# Patient Record
Sex: MALE | URBAN - METROPOLITAN AREA
[De-identification: names, ages, dates, MRNs, and addresses within clinical notes are randomized per-mention and may not be internally consistent; named-entity substitution may affect disease eponyms.]

---

## 2022-05-18 ENCOUNTER — APPOINTMENT (OUTPATIENT)
Dept: URBAN - METROPOLITAN AREA CLINIC 319 | Age: 37
Setting detail: DERMATOLOGY
End: 2022-05-18

## 2022-05-18 PROBLEM — C43.72 MALIGNANT MELANOMA OF LEFT LOWER LIMB, INCLUDING HIP: Status: ACTIVE | Noted: 2022-05-18

## 2022-05-18 PROCEDURE — OTHER EXCISION: OTHER

## 2022-05-18 PROCEDURE — OTHER PRESCRIPTION: OTHER

## 2022-05-18 PROCEDURE — 11603 EXC TR-EXT MAL+MARG 2.1-3 CM: CPT

## 2022-05-18 RX ORDER — CHLORHEXIDINE GLUCONATE 213 G/1000ML
SOLUTION TOPICAL
Qty: 118 | Refills: 1 | Status: ERX | COMMUNITY
Start: 2022-05-18

## 2022-05-18 RX ORDER — MUPIROCIN 20 MG/G
OINTMENT TOPICAL
Qty: 22 | Refills: 1 | Status: ERX | COMMUNITY
Start: 2022-05-18

## 2022-05-18 NOTE — PROCEDURE: EXCISION
Post-Care Instructions: I reviewed with the patient in detail post-care instructions. Patient is not to engage in any heavy lifting, exercise, or swimming for the next 14 days. Should the patient develop any fevers, chills, bleeding, severe pain patient will contact the office immediately. Unna boot to be left in place for one week.

## 2022-06-02 ENCOUNTER — APPOINTMENT (OUTPATIENT)
Dept: URBAN - METROPOLITAN AREA CLINIC 319 | Age: 37
Setting detail: DERMATOLOGY
End: 2022-06-02

## 2022-06-02 DIAGNOSIS — Z48.817 ENCOUNTER FOR SURGICAL AFTERCARE FOLLOWING SURGERY ON THE SKIN AND SUBCUTANEOUS TISSUE: ICD-10-CM

## 2022-06-02 PROCEDURE — OTHER PHOTO-DOCUMENTATION: OTHER

## 2022-06-02 PROCEDURE — 99212 OFFICE O/P EST SF 10 MIN: CPT

## 2022-06-02 PROCEDURE — OTHER POST-OP WOUND CHECK (NO GLOBAL PERIOD): OTHER

## 2022-06-02 ASSESSMENT — LOCATION DETAILED DESCRIPTION DERM: LOCATION DETAILED: LEFT MEDIAL DISTAL PRETIBIAL REGION

## 2022-06-02 ASSESSMENT — LOCATION ZONE DERM: LOCATION ZONE: LEG

## 2022-06-02 ASSESSMENT — LOCATION SIMPLE DESCRIPTION DERM: LOCATION SIMPLE: LEFT PRETIBIAL REGION

## 2023-04-13 NOTE — PROCEDURE: EXCISION
Extensive smoking history, 40 years, 1ppd. Has been in the smoking cessation program for several years now but still smoking. Counseled on smoking cessation. Discussed at length long term health risks and complications, such as smoking increases risk of heart disease and lung cancer. LDCT lung screening to evaluate lung cancer     Wound Care: Polysporin ointment